# Patient Record
Sex: FEMALE | Race: WHITE | NOT HISPANIC OR LATINO | Employment: FULL TIME | URBAN - METROPOLITAN AREA
[De-identification: names, ages, dates, MRNs, and addresses within clinical notes are randomized per-mention and may not be internally consistent; named-entity substitution may affect disease eponyms.]

---

## 2023-02-03 ENCOUNTER — TELEPHONE (OUTPATIENT)
Dept: NEUROLOGY | Facility: CLINIC | Age: 48
End: 2023-02-03
Payer: COMMERCIAL

## 2023-02-23 ENCOUNTER — OFFICE VISIT (OUTPATIENT)
Dept: NEUROLOGY | Facility: CLINIC | Age: 48
End: 2023-02-23
Payer: COMMERCIAL

## 2023-02-23 VITALS
HEIGHT: 62 IN | HEART RATE: 66 BPM | SYSTOLIC BLOOD PRESSURE: 141 MMHG | BODY MASS INDEX: 24.83 KG/M2 | DIASTOLIC BLOOD PRESSURE: 76 MMHG | WEIGHT: 134.94 LBS

## 2023-02-23 DIAGNOSIS — G43.719 INTRACTABLE CHRONIC MIGRAINE WITHOUT AURA AND WITHOUT STATUS MIGRAINOSUS: Primary | ICD-10-CM

## 2023-02-23 DIAGNOSIS — Z01.00 ROUTINE EYE EXAM: ICD-10-CM

## 2023-02-23 PROCEDURE — 99999 PR PBB SHADOW E&M-EST. PATIENT-LVL III: ICD-10-PCS | Mod: PBBFAC,,, | Performed by: STUDENT IN AN ORGANIZED HEALTH CARE EDUCATION/TRAINING PROGRAM

## 2023-02-23 PROCEDURE — 99203 OFFICE O/P NEW LOW 30 MIN: CPT | Mod: S$GLB,,, | Performed by: STUDENT IN AN ORGANIZED HEALTH CARE EDUCATION/TRAINING PROGRAM

## 2023-02-23 PROCEDURE — 3078F PR MOST RECENT DIASTOLIC BLOOD PRESSURE < 80 MM HG: ICD-10-PCS | Mod: CPTII,S$GLB,, | Performed by: STUDENT IN AN ORGANIZED HEALTH CARE EDUCATION/TRAINING PROGRAM

## 2023-02-23 PROCEDURE — 3008F PR BODY MASS INDEX (BMI) DOCUMENTED: ICD-10-PCS | Mod: CPTII,S$GLB,, | Performed by: STUDENT IN AN ORGANIZED HEALTH CARE EDUCATION/TRAINING PROGRAM

## 2023-02-23 PROCEDURE — 99203 PR OFFICE/OUTPT VISIT, NEW, LEVL III, 30-44 MIN: ICD-10-PCS | Mod: S$GLB,,, | Performed by: STUDENT IN AN ORGANIZED HEALTH CARE EDUCATION/TRAINING PROGRAM

## 2023-02-23 PROCEDURE — 99999 PR PBB SHADOW E&M-EST. PATIENT-LVL III: CPT | Mod: PBBFAC,,, | Performed by: STUDENT IN AN ORGANIZED HEALTH CARE EDUCATION/TRAINING PROGRAM

## 2023-02-23 PROCEDURE — 1159F PR MEDICATION LIST DOCUMENTED IN MEDICAL RECORD: ICD-10-PCS | Mod: CPTII,S$GLB,, | Performed by: STUDENT IN AN ORGANIZED HEALTH CARE EDUCATION/TRAINING PROGRAM

## 2023-02-23 PROCEDURE — 99213 OFFICE O/P EST LOW 20 MIN: CPT | Mod: PBBFAC | Performed by: STUDENT IN AN ORGANIZED HEALTH CARE EDUCATION/TRAINING PROGRAM

## 2023-02-23 PROCEDURE — 3077F PR MOST RECENT SYSTOLIC BLOOD PRESSURE >= 140 MM HG: ICD-10-PCS | Mod: CPTII,S$GLB,, | Performed by: STUDENT IN AN ORGANIZED HEALTH CARE EDUCATION/TRAINING PROGRAM

## 2023-02-23 PROCEDURE — 3008F BODY MASS INDEX DOCD: CPT | Mod: CPTII,S$GLB,, | Performed by: STUDENT IN AN ORGANIZED HEALTH CARE EDUCATION/TRAINING PROGRAM

## 2023-02-23 PROCEDURE — 3078F DIAST BP <80 MM HG: CPT | Mod: CPTII,S$GLB,, | Performed by: STUDENT IN AN ORGANIZED HEALTH CARE EDUCATION/TRAINING PROGRAM

## 2023-02-23 PROCEDURE — 1159F MED LIST DOCD IN RCRD: CPT | Mod: CPTII,S$GLB,, | Performed by: STUDENT IN AN ORGANIZED HEALTH CARE EDUCATION/TRAINING PROGRAM

## 2023-02-23 PROCEDURE — 3077F SYST BP >= 140 MM HG: CPT | Mod: CPTII,S$GLB,, | Performed by: STUDENT IN AN ORGANIZED HEALTH CARE EDUCATION/TRAINING PROGRAM

## 2023-02-23 RX ORDER — UBROGEPANT 100 MG/1
100 TABLET ORAL ONCE AS NEEDED
Qty: 8 TABLET | Refills: 0 | Status: SHIPPED | OUTPATIENT
Start: 2023-02-23 | End: 2023-02-23

## 2023-02-23 RX ORDER — GALCANEZUMAB 120 MG/ML
240 INJECTION, SOLUTION SUBCUTANEOUS ONCE
Qty: 2 ML | Refills: 0 | Status: SHIPPED | OUTPATIENT
Start: 2023-02-23 | End: 2023-03-17

## 2023-02-23 RX ORDER — GALCANEZUMAB 120 MG/ML
120 INJECTION, SOLUTION SUBCUTANEOUS
Qty: 4 ML | Refills: 0 | Status: SHIPPED | OUTPATIENT
Start: 2023-02-23

## 2023-02-23 NOTE — PROGRESS NOTES
Neurology Clinic Note      Date: 2/23/23  Patient Name: Amelia Kenney   MRN: [id number]   PCP: Primary Doctor No  Referring Provider: Self, Aaareferral    Assessment and Plan:   Amelia Kenney is a 47 y.o. female presenting for evaluation of chronic migraines.  Was previously on Emgality and doing well on it.    -- Preventive: Sent prior authorization for Emgality (240mg loading dose followed 120mg monthly).  She has previously tried and failed/not tolerated propranolol, topiramate and amitriptyline.  -- Acute: Sent prior authorization for Ubrelvy.   Has tried sumatriptan in the past but developed chest tightness to this and hence, would prefer to avoid a trial of other triptans.  -- Start tracking headaches via Migraine Rafael ke on phone.  -- Consider the following syupplements: Mg Oxide 400mg daily or Riboflavin (Vit B2) 400mg daily or CoQ 200mg daily  -- Lifestyle modifications:  1) Sleep hygiene  2) Healthy diet. Avoid processed foods.  3) Exercise. 30 mins of cardio atleast 3-4 days per week.      I will follow up with her in 3 months.        Problem List Items Addressed This Visit          Neuro    Intractable chronic migraine without aura and without status migrainosus - Primary    Relevant Medications    galcanezumab-gnlm (EMGALITY PEN) 120 mg/mL PnIj    galcanezumab-gnlm (EMGALITY PEN) 120 mg/mL PnIj    ubrogepant (UBRELVY) 100 mg tablet       Ophtho    Routine eye exam    Relevant Orders    Ambulatory referral/consult to Ophthalmology         Subjective:          HPI:   Ms. Amelia Kenney is a 47 y.o. female presenting for evaluation of migraines.    Has had headaches since she was a kid.  Describes them as mostly unilateral (right-sided) vs occipital/holocephalic throbbing headaches associated with nausea/vomiting, photophobia and phonophobia.  Associated with floaters in her visual field which occurs during the headache.  Sometimes when the headache is unilateral, it is associated with conjunctival injection  and lacrimation but these headaches typically lasts for days.    She was previously followed in [facility] and was started on Emgality around a years ago.  She noticed a remarkable improvement in her headache frequency and intensity since starting this medication.  She was also on Ubrelvy as needed but was requiring it less often while on the CGRP injectable.  She recently changed jobs and moved to [facility] and ran out of her Emgality around 3 months ago.  Since then, she has been having daily headaches.    Last MRI brain in 2022 was read as normal.    Medications previously tried -  Topiramate - develop side effects/could not tolerate  Propranolol - had low blood pressure  Sumatriptan - developed severe chest tightness and dizziness  Amitriptyline - could not tolerate    Her grandfather was diagnosed with cluster headaches    PAST MEDICAL HISTORY:  Past Medical History:   Diagnosis Date    ADHD     Anxiety     GERD (gastroesophageal reflux disease)     Hypothyroidism     Migraine        PAST SURGICAL HISTORY:  Past Surgical History:   Procedure Laterality Date     SECTION      HYSTERECTOMY, TOTAL, LAPAROSCOPIC, WITH SALPINGECTOMY         CURRENT MEDS:  Current Outpatient Medications   Medication Sig Dispense Refill    acyclovir (ZOVIRAX) 400 MG tablet TAKE 1 TABLET BY MOUTH 4 TIMES A  tablet 4    ALPRAZolam (XANAX) 0.25 MG tablet alprazolam 0.25 mg tablet      dextroamphetamine-amphetamine 10 mg Tab dextroamphetamine-amphetamine 10 mg tablet      EMGALITY  mg/mL PnIj       fluorometholone 0.1% (FML) 0.1 % DrpS fluorometholone 0.1 % eye drops,suspension      pantoprazole (PROTONIX) 40 MG tablet pantoprazole 40 mg tablet,delayed release      propranoloL (INDERAL) 40 MG tablet propranolol 40 mg tablet      galcanezumab-gnlm (EMGALITY PEN) 120 mg/mL PnIj Inject 240 mg into the skin once. First dose for 1 dose 2 mL 0    galcanezumab-gnlm (EMGALITY PEN) 120 mg/mL PnIj Inject 120 mg  "into the skin every 28 days. 4 mL 0    levothyroxine (SYNTHROID) 125 MCG tablet Take 125 mcg by mouth.      ubrogepant (UBRELVY) 100 mg tablet Take 1 tablet (100 mg total) by mouth once as needed for Migraine. 8 tablet 0     No current facility-administered medications for this visit.       ALLERGIES:  Review of patient's allergies indicates:   Allergen Reactions    Topiramate Other (See Comments)     Numbness in arms          FAMILY HISTORY:  Family History   Problem Relation Age of Onset    Ovarian cancer Maternal Grandmother     Hypertension Maternal Grandmother     Colon cancer Maternal Grandfather     Heart disease Paternal Grandmother        SOCIAL HISTORY:  Social History     Tobacco Use    Smoking status: Never    Smokeless tobacco: Never   Substance Use Topics    Alcohol use: Yes       Review of Systems:  12 system review of systems is negative except for the symptoms mentioned in HPI.      Objective:     Vitals:    02/23/23 1027   BP: (!) 141/76   Pulse: 66   Weight: 61.2 kg (134 lb 14.7 oz)   Height: 5' 2" (1.575 m)     General: NAD, well nourished   Eyes: no tearing, discharge, no erythema   Neck: Supple, full range of motion  Cardiovascular: Warm and well perfused  Lungs: Normal work of breathing  Skin: No rash, lesions, or breakdown on exposed skin  Psychiatry: Mood and affect are appropriate       NEUROLOGICAL EXAMINATION:     MENTAL STATUS   Oriented to person, place, and time.   Follows 2 step commands.   Speech: speech is normal   Level of consciousness: alert    CRANIAL NERVES     CN II   Visual fields full to confrontation.     CN III, IV, VI   Extraocular motions are normal.   Nystagmus: none   Ophthalmoparesis: none    CN V   Facial sensation intact.     CN VII   Facial expression full, symmetric.     CN XI   CN XI normal.     CN XII   CN XII normal.     MOTOR EXAM   Right leg tone: spastic       5/5 in bilateral upper and lower extremities.     SENSORY EXAM   Light touch normal.     GAIT AND " COORDINATION     Gait  Gait: normal     Coordination   Finger to nose coordination: normal    Tremor   Intention tremor: absent        Jaxson Perez MD  Department of Neurology  [facility]

## 2023-02-23 NOTE — PATIENT INSTRUCTIONS
-- Preventive: Sent prior authorization for Emgality (240mg loading dose followed 120mg monthly).    -- Acute: Sent prior authorization for Ubrelvy.   -- Start tracking headaches via Migraine Rafael ke on phone.  -- Consider the following syupplements: Mg Oxide 400mg daily or Riboflavin (Vit B2) 400mg daily or CoQ 200mg daily  -- Lifestyle modifications:  1) Sleep hygiene  2) Healthy diet. Avoid processed foods.  3) Exercise. 30 mins of cardio atleast 3-4 days per week.

## 2023-03-01 ENCOUNTER — TELEPHONE (OUTPATIENT)
Dept: PHARMACY | Facility: CLINIC | Age: 48
End: 2023-03-01
Payer: COMMERCIAL

## 2023-03-01 NOTE — TELEPHONE ENCOUNTER
DOCUMENTATION ONLY  Emgality (2mL per 30 days)  Approval date: 2/28/2023 to 3/27/2023   Case ID# [id number]    Emgality (1mL per 30 days)  Approval date: 3/22/2023 to 8/21/2023   Case ID# [id number]

## 2023-03-16 ENCOUNTER — PATIENT MESSAGE (OUTPATIENT)
Dept: PHARMACY | Facility: CLINIC | Age: 48
End: 2023-03-16
Payer: COMMERCIAL

## 2023-06-30 ENCOUNTER — TELEPHONE (OUTPATIENT)
Dept: PHARMACY | Facility: CLINIC | Age: 48
End: 2023-06-30
Payer: COMMERCIAL

## 2023-06-30 ENCOUNTER — TELEPHONE (OUTPATIENT)
Dept: OBSTETRICS AND GYNECOLOGY | Facility: CLINIC | Age: 48
End: 2023-06-30
Payer: COMMERCIAL

## 2023-06-30 ENCOUNTER — LAB VISIT (OUTPATIENT)
Dept: LAB | Facility: HOSPITAL | Age: 48
End: 2023-06-30
Attending: OBSTETRICS & GYNECOLOGY
Payer: COMMERCIAL

## 2023-06-30 DIAGNOSIS — N39.0 URINARY TRACT INFECTION WITHOUT HEMATURIA, SITE UNSPECIFIED: Primary | ICD-10-CM

## 2023-06-30 DIAGNOSIS — N39.0 URINARY TRACT INFECTION WITHOUT HEMATURIA, SITE UNSPECIFIED: ICD-10-CM

## 2023-06-30 PROCEDURE — 87186 SC STD MICRODIL/AGAR DIL: CPT | Performed by: OBSTETRICS & GYNECOLOGY

## 2023-06-30 PROCEDURE — 87086 URINE CULTURE/COLONY COUNT: CPT | Performed by: OBSTETRICS & GYNECOLOGY

## 2023-06-30 PROCEDURE — 87077 CULTURE AEROBIC IDENTIFY: CPT | Performed by: OBSTETRICS & GYNECOLOGY

## 2023-06-30 PROCEDURE — 87088 URINE BACTERIA CULTURE: CPT | Performed by: OBSTETRICS & GYNECOLOGY

## 2023-06-30 RX ORDER — CIPROFLOXACIN 500 MG/1
500 TABLET ORAL 2 TIMES DAILY
Qty: 6 TABLET | Refills: 0 | Status: SHIPPED | OUTPATIENT
Start: 2023-06-30 | End: 2023-07-03

## 2023-06-30 NOTE — TELEPHONE ENCOUNTER
Hello,     The prior authorization for Amelia Kenney's UBRELVY prescription has been APPROVED FROM 06/30/2023 TO 12/26/2023 with copayment of $0.       [facility] [phone] will reach out to patient for further correspondence.       If there are any additional questions or concerns, please contact me.    Sincerely,  Kait Rosenberg  Prior Authorization Department  [facility]  [phone]

## 2023-07-02 LAB — BACTERIA UR CULT: ABNORMAL

## 2023-09-13 ENCOUNTER — TELEPHONE (OUTPATIENT)
Dept: OBSTETRICS AND GYNECOLOGY | Facility: CLINIC | Age: 48
End: 2023-09-13
Payer: COMMERCIAL

## 2023-09-13 ENCOUNTER — HOSPITAL ENCOUNTER (OUTPATIENT)
Dept: RADIOLOGY | Facility: OTHER | Age: 48
Discharge: HOME | End: 2023-09-13
Attending: OBSTETRICS & GYNECOLOGY
Payer: COMMERCIAL

## 2023-09-13 VITALS — WEIGHT: 134 LBS | BODY MASS INDEX: 24.51 KG/M2

## 2023-09-13 DIAGNOSIS — E07.9 THYROID DISORDER: ICD-10-CM

## 2023-09-13 DIAGNOSIS — Z01.419 ENCOUNTER FOR WELL WOMAN EXAM WITH ROUTINE GYNECOLOGICAL EXAM: ICD-10-CM

## 2023-09-13 DIAGNOSIS — Z12.31 SCREENING MAMMOGRAM FOR BREAST CANCER: Primary | ICD-10-CM

## 2023-09-13 DIAGNOSIS — Z12.31 SCREENING MAMMOGRAM FOR BREAST CANCER: ICD-10-CM

## 2023-09-13 PROCEDURE — 77067 SCR MAMMO BI INCL CAD: CPT | Mod: 26,,, | Performed by: RADIOLOGY

## 2023-09-13 PROCEDURE — 77067 MAMMO DIGITAL SCREENING BILAT WITH TOMO: ICD-10-PCS | Mod: 26,,, | Performed by: RADIOLOGY

## 2023-09-13 PROCEDURE — 77063 BREAST TOMOSYNTHESIS BI: CPT | Mod: 26,,, | Performed by: RADIOLOGY

## 2023-09-13 PROCEDURE — 77067 SCR MAMMO BI INCL CAD: CPT | Mod: TC

## 2023-09-13 PROCEDURE — 77063 MAMMO DIGITAL SCREENING BILAT WITH TOMO: ICD-10-PCS | Mod: 26,,, | Performed by: RADIOLOGY

## 2023-09-15 ENCOUNTER — LAB VISIT (OUTPATIENT)
Dept: LAB | Facility: HOSPITAL | Age: 48
End: 2023-09-15
Attending: OBSTETRICS & GYNECOLOGY
Payer: COMMERCIAL

## 2023-09-15 DIAGNOSIS — E07.9 THYROID DISORDER: ICD-10-CM

## 2023-09-15 DIAGNOSIS — Z01.419 ENCOUNTER FOR WELL WOMAN EXAM WITH ROUTINE GYNECOLOGICAL EXAM: ICD-10-CM

## 2023-09-15 LAB
BASOPHILS # BLD AUTO: 0.04 K/UL (ref 0–0.2)
BASOPHILS NFR BLD: 0.8 % (ref 0–1.9)
CHOLEST SERPL-MCNC: 178 MG/DL (ref 120–199)
CHOLEST/HDLC SERPL: 2.5 {RATIO} (ref 2–5)
DIFFERENTIAL METHOD: ABNORMAL
EOSINOPHIL # BLD AUTO: 0.1 K/UL (ref 0–0.5)
EOSINOPHIL NFR BLD: 2.7 % (ref 0–8)
ERYTHROCYTE [DISTWIDTH] IN BLOOD BY AUTOMATED COUNT: 11.4 % (ref 11.5–14.5)
HCT VFR BLD AUTO: 42.9 % (ref 37–48.5)
HDLC SERPL-MCNC: 72 MG/DL (ref 40–75)
HDLC SERPL: 40.4 % (ref 20–50)
HGB BLD-MCNC: 14.4 G/DL (ref 12–16)
IMM GRANULOCYTES # BLD AUTO: 0.01 K/UL (ref 0–0.04)
IMM GRANULOCYTES NFR BLD AUTO: 0.2 % (ref 0–0.5)
LDLC SERPL CALC-MCNC: 92 MG/DL (ref 63–159)
LYMPHOCYTES # BLD AUTO: 1.3 K/UL (ref 1–4.8)
LYMPHOCYTES NFR BLD: 25.3 % (ref 18–48)
MCH RBC QN AUTO: 31.9 PG (ref 27–31)
MCHC RBC AUTO-ENTMCNC: 33.6 G/DL (ref 32–36)
MCV RBC AUTO: 95 FL (ref 82–98)
MONOCYTES # BLD AUTO: 0.5 K/UL (ref 0.3–1)
MONOCYTES NFR BLD: 9.2 % (ref 4–15)
NEUTROPHILS # BLD AUTO: 3.2 K/UL (ref 1.8–7.7)
NEUTROPHILS NFR BLD: 61.8 % (ref 38–73)
NONHDLC SERPL-MCNC: 106 MG/DL
NRBC BLD-RTO: 0 /100 WBC
PLATELET # BLD AUTO: 287 K/UL (ref 150–450)
PMV BLD AUTO: 11 FL (ref 9.2–12.9)
RBC # BLD AUTO: 4.52 M/UL (ref 4–5.4)
T3 SERPL-MCNC: 71 NG/DL (ref 60–180)
T4 FREE SERPL-MCNC: 1.14 NG/DL (ref 0.71–1.51)
TRIGL SERPL-MCNC: 70 MG/DL (ref 30–150)
TSH SERPL DL<=0.005 MIU/L-ACNC: 0.03 UIU/ML (ref 0.4–4)
WBC # BLD AUTO: 5.22 K/UL (ref 3.9–12.7)

## 2023-09-15 PROCEDURE — 84439 ASSAY OF FREE THYROXINE: CPT | Performed by: OBSTETRICS & GYNECOLOGY

## 2023-09-15 PROCEDURE — 84480 ASSAY TRIIODOTHYRONINE (T3): CPT | Performed by: OBSTETRICS & GYNECOLOGY

## 2023-09-15 PROCEDURE — 85025 COMPLETE CBC W/AUTO DIFF WBC: CPT | Performed by: OBSTETRICS & GYNECOLOGY

## 2023-09-15 PROCEDURE — 84443 ASSAY THYROID STIM HORMONE: CPT | Performed by: OBSTETRICS & GYNECOLOGY

## 2023-09-15 PROCEDURE — 36415 COLL VENOUS BLD VENIPUNCTURE: CPT | Mod: PN | Performed by: OBSTETRICS & GYNECOLOGY

## 2023-09-15 PROCEDURE — 80061 LIPID PANEL: CPT | Performed by: OBSTETRICS & GYNECOLOGY

## 2024-01-11 ENCOUNTER — TELEPHONE (OUTPATIENT)
Dept: PRIMARY CARE CLINIC | Facility: CLINIC | Age: 49
End: 2024-01-11
Payer: COMMERCIAL

## 2024-01-16 ENCOUNTER — LAB VISIT (OUTPATIENT)
Dept: LAB | Facility: OTHER | Age: 49
End: 2024-01-16
Attending: OBSTETRICS & GYNECOLOGY
Payer: COMMERCIAL

## 2024-01-16 DIAGNOSIS — Z01.89 ROUTINE LAB DRAW: ICD-10-CM

## 2024-01-16 DIAGNOSIS — Z01.89 ROUTINE LAB DRAW: Primary | ICD-10-CM

## 2024-01-16 PROCEDURE — 86765 RUBEOLA ANTIBODY: CPT | Performed by: OBSTETRICS & GYNECOLOGY

## 2024-01-16 PROCEDURE — 86762 RUBELLA ANTIBODY: CPT | Performed by: OBSTETRICS & GYNECOLOGY

## 2024-01-16 PROCEDURE — 86787 VARICELLA-ZOSTER ANTIBODY: CPT | Performed by: OBSTETRICS & GYNECOLOGY

## 2024-01-16 PROCEDURE — 86480 TB TEST CELL IMMUN MEASURE: CPT | Performed by: OBSTETRICS & GYNECOLOGY

## 2024-01-16 PROCEDURE — 86706 HEP B SURFACE ANTIBODY: CPT | Performed by: OBSTETRICS & GYNECOLOGY

## 2024-01-16 PROCEDURE — 86735 MUMPS ANTIBODY: CPT | Performed by: OBSTETRICS & GYNECOLOGY

## 2024-01-17 LAB
RUBV IGG SER-ACNC: 59.7 IU/ML
RUBV IGG SER-IMP: REACTIVE

## 2024-01-20 LAB
GAMMA INTERFERON BACKGROUND BLD IA-ACNC: 0.11 IU/ML
M TB IFN-G CD4+ BCKGRND COR BLD-ACNC: -0.02 IU/ML
M TB IFN-G CD4+ BCKGRND COR BLD-ACNC: -0.02 IU/ML
MITOGEN IGNF BCKGRD COR BLD-ACNC: 9.89 IU/ML
TB GOLD PLUS: NEGATIVE

## 2024-01-21 LAB
MUMPS IGG INTERPRETATION: POSITIVE
MUMPS IGG SCREEN: 64.2 AU/ML
RUBEOLA IGG ANTIBODY: >300 AU/ML
RUBEOLA INTERPRETATION: POSITIVE
VARICELLA INTERPRETATION: POSITIVE
VARICELLA ZOSTER IGG: 1256 AU/ML

## 2024-01-22 LAB
HBV SURFACE AB SER QL IA: POSITIVE
HBV SURFACE AB SERPL IA-ACNC: 52 MIU/ML

## 2024-03-19 RX ORDER — PANTOPRAZOLE SODIUM 40 MG/1
40 TABLET, DELAYED RELEASE ORAL EVERY MORNING
Qty: 30 TABLET | Refills: 11 | Status: SHIPPED | OUTPATIENT
Start: 2024-03-19

## 2024-03-19 RX ORDER — DEXTROAMPHETAMINE SACCHARATE, AMPHETAMINE ASPARTATE, DEXTROAMPHETAMINE SULFATE AND AMPHETAMINE SULFATE 5; 5; 5; 5 MG/1; MG/1; MG/1; MG/1
1 TABLET ORAL DAILY
Qty: 30 TABLET | Refills: 0 | Status: SHIPPED | OUTPATIENT
Start: 2024-03-19 | End: 2024-05-10 | Stop reason: SDUPTHER

## 2024-03-19 RX ORDER — LEVOTHYROXINE SODIUM 112 UG/1
112 TABLET ORAL DAILY
Qty: 30 TABLET | Refills: 11 | Status: SHIPPED | OUTPATIENT
Start: 2024-03-19 | End: 2025-03-19

## 2024-05-08 PROBLEM — K21.9 GERD (GASTROESOPHAGEAL REFLUX DISEASE): Status: ACTIVE | Noted: 2024-05-08

## 2024-05-09 NOTE — PROGRESS NOTES
[facility] Note    Chief Complaint      Chief Complaint   Patient presents with    Establish Care       History of Present Illness      Amelia Kenney is a 49 y.o. F with GERD, Polycythemia, Hyperbilirubinemia, ADHD, Insomnia, Migraines, Hypothyroidism, Chronic constipation, Nephrolithiasis, DDD C spine  presents to est PCP and to fu chronic issues.     Migraines - Was previously on Emgality and doing well on it. She has previously tried and failed/not tolerated propranolol, topiramate and amitriptyline. Pt had reaction to Topamax - numbness in arms. Pt on Ubrelvy prn. Has tried sumatriptan in the past but developed chest tightness to this and hence, would prefer to avoid a trial of other triptans.    Hypothyroidism - Pt on levothyroxine 112 mcg/d.  She takes 30 min prior to other meds/food. Last TSH - low in Sept.  Will repeat TSH.     GERD - Pt on Protonix daily.     ADHD - Pt on adderall 20 mg/d. Pt has not had formal testing as an adult but has been getting from Prev PCP in  since 2015. Will fill for now with idea she will get her testing when gets insurance    Onychomycosis - Will try to RX Ciclopirox and if not tolerated can try OTC Funginail.    ?h/o polycythemia - H/H - 14.4/42.9 - wnl - 9/15/23    Depression - Prev on wellbutrin x a few yrs but stopped it in Jan. Doing well off meds.     HCM - Flu - 12/1/23;  Tdap - 1/16/24;  PCV 13 - none;  PVX 23 - none;  Shingrix - none - due at 52 y.o.;  COVID - 19 Vaccine 10/6/22 and #2 1-/27/22; MGM - 9/13/23 - repeat 1 yr;  DEXA - none;  PAP - s/p total Hys no BSO due to fibroids; Hep C Screen - none - defers;  HIV Screen - none - defers; C-scope - none;   Prev PCP - Dr. Foster in  ; Ob/GYN - Dr. Quiñones; Neuro - Dr. Perez    Patient Care Team:  Bruna Garcia MD as PCP - General (Internal Medicine)     Health Maintenance:  Immunization History   Administered Date(s) Administered    COVID-19, MRNA, LN-S, PF (Pfizer) (Gray Cap)  10/06/2022, 10/27/2022    Influenza - Quadrivalent - PF *Preferred* (6 months and older) 2022, 2023    PPD Test 2024    Tdap 2024      Health Maintenance   Topic Date Due    Hepatitis C Screening  Never done    Colorectal Cancer Screening  Never done    Mammogram  2024    Lipid Panel  09/15/2028    TETANUS VACCINE  2034        Past Medical History:  Past Medical History:   Diagnosis Date    ADHD     Anxiety     GERD (gastroesophageal reflux disease)     Hypothyroidism     Migraine        Past Surgical History:   has a past surgical history that includes  section; hysterectomy, total, laparoscopic, with salpingectomy; and Tubal ligation.    Family History:  family history includes Alcohol abuse in her father; Colon cancer in her maternal grandfather; Diabetes in her maternal grandmother; Heart disease in her paternal grandmother; Hypertension in her maternal grandmother; Kidney disease in her daughter.     Social History:  Social History     Tobacco Use    Smoking status: Never    Smokeless tobacco: Never   Substance Use Topics    Alcohol use: Yes     Alcohol/week: 3.0 standard drinks of alcohol     Types: 3 Glasses of wine per week     Comment: 1 glass wine x 2-3/wk    Drug use: Not Currently       Review of Systems   Constitutional:  Negative for chills, diaphoresis and fever.   HENT:  Negative for hearing loss.    Eyes:  Positive for visual disturbance.        Wears readers - worsening   Respiratory:  Negative for cough, shortness of breath and wheezing.    Cardiovascular:  Negative for chest pain, palpitations and leg swelling.   Gastrointestinal:  Positive for abdominal pain, constipation and nausea. Negative for blood in stool, diarrhea, vomiting and reflux.        Occ epigastric pain.  GERD controlled on Protonix daily. Always feels bloated.  Reports neg EGD and gastroparesis study.  Rec adeq hydration.  Rec inc fiber in diet.  Rec Metamucil and Miralax prn.        Endocrine: Negative for cold intolerance, heat intolerance and polydipsia.   Genitourinary:  Negative for dysuria, frequency and hematuria.   Musculoskeletal:  Negative for arthralgias and myalgias.   Neurological:  Negative for dizziness and headaches.   Psychiatric/Behavioral:  Negative for dysphoric mood. The patient is nervous/anxious.         She is in school. Stable and situational.         Medications:    Current Outpatient Medications:     acyclovir (ZOVIRAX) 400 MG tablet, TAKE 1 TABLET BY MOUTH 4 TIMES A DAY (Patient taking differently: Take 400 mg by mouth Daily.), Disp: 120 tablet, Rfl: 4    galcanezumab-gnlm (EMGALITY PEN) 120 mg/mL PnIj, Inject 120 mg into the skin every 28 days., Disp: 4 mL, Rfl: 0    levothyroxine (SYNTHROID) 112 MCG tablet, Take 1 tablet (112 mcg total) by mouth once daily., Disp: 30 tablet, Rfl: 11    pantoprazole (PROTONIX) 40 MG tablet, Take 1 tablet by mouth every morning., Disp: 90 tablet, Rfl: 1    pantoprazole (PROTONIX) 40 MG tablet, Take 1 tablet by mouth in the morning., Disp: 30 tablet, Rfl: 11    ubrogepant (UBRELVY) 100 mg tablet, Take one a onset of migraine.  May repeat if needed.  Max 2 in 24 hrs, Disp: 10 tablet, Rfl: 3    ciclopirox (PENLAC) 8 % Soln, Apply topically nightly. Remove once weekly with nail polish remover, Disp: 6.6 mL, Rfl: 1    dextroamphetamine-amphetamine (ADDERALL) 20 mg tablet, Take 1 tablet by mouth once daily., Disp: 30 tablet, Rfl: 0    [START ON 6/7/2024] dextroamphetamine-amphetamine (ADDERALL) 20 mg tablet, Take 1 tablet by mouth once daily., Disp: 30 tablet, Rfl: 0    [START ON 7/5/2024] dextroamphetamine-amphetamine (ADDERALL) 20 mg tablet, Take 1 tablet by mouth once daily., Disp: 30 tablet, Rfl: 0     Allergies:  Review of patient's allergies indicates:   Allergen Reactions    Topiramate Other (See Comments)     Numbness in arms          Physical Exam      Vital Signs  Temp: 98.2 °F (36.8 °C)  Temp Source: Oral  Pulse: 72  SpO2: 98  "%  BP: 136/86  BP Location: Right arm  Patient Position: Sitting  Pain Score:   5  Pain Loc: Shoulder  Height and Weight  Height: 5' 1" (154.9 cm)  Weight: 60.8 kg (134 lb 0.6 oz)  BSA (Calculated - sq m): 1.62 sq meters  BMI (Calculated): 25.3  Weight in (lb) to have BMI = 25: 132      Patient Position: Sitting      Physical Exam  Vitals reviewed.   Constitutional:       General: She is not in acute distress.     Appearance: Normal appearance. She is not ill-appearing, toxic-appearing or diaphoretic.   HENT:      Head: Normocephalic and atraumatic.      Right Ear: Tympanic membrane normal.      Left Ear: Tympanic membrane normal.      Mouth/Throat:      Mouth: Mucous membranes are moist.      Pharynx: No posterior oropharyngeal erythema.   Eyes:      Extraocular Movements: Extraocular movements intact.      Conjunctiva/sclera: Conjunctivae normal.      Pupils: Pupils are equal, round, and reactive to light.   Neck:      Vascular: No carotid bruit.   Cardiovascular:      Rate and Rhythm: Normal rate and regular rhythm.      Pulses: Normal pulses.      Heart sounds: Normal heart sounds.   Pulmonary:      Effort: Pulmonary effort is normal. No respiratory distress.      Breath sounds: Normal breath sounds.   Abdominal:      General: Bowel sounds are normal. There is no distension.      Palpations: Abdomen is soft.      Tenderness: There is no abdominal tenderness. There is no guarding or rebound.   Musculoskeletal:      Cervical back: Neck supple. No tenderness.   Neurological:      General: No focal deficit present.      Mental Status: She is alert and oriented to person, place, and time.   Psychiatric:         Mood and Affect: Mood normal.         Behavior: Behavior normal.          Laboratory:  CBC:  Recent Labs   Lab 09/15/23  0758   WBC 5.22   RBC 4.52   Hemoglobin 14.4   Hematocrit 42.9   Platelets 287   MCV 95   MCH 31.9 H   MCHC 33.6              LIPIDS:  Recent Labs   Lab 10/29/21  0757 09/15/23  0758   TSH " 0.18 L 0.028 L   HDL  --  72   Cholesterol  --  178   Triglycerides  --  70   LDL Cholesterol  --  92.0   HDL/Cholesterol Ratio  --  40.4   Non-HDL Cholesterol  --  106   Total Cholesterol/HDL Ratio  --  2.5       TSH:  Recent Labs   Lab 10/29/21  0757 09/15/23  0758   TSH 0.18 L 0.028 L       A1C:  Recent Labs   Lab 10/29/21  0757   Hemoglobin A1C 4.9               Assessment/Plan     Amelia Kenney is a 49 y.o.female with:    Hypothyroidism, unspecified type  -     TSH; Future; Expected date: 05/10/2024  - Recent low TSH.  Will repeat TSH and adjust as warranted.     Attention deficit hyperactivity disorder (ADHD), other type  -     dextroamphetamine-amphetamine (ADDERALL) 20 mg tablet; Take 1 tablet by mouth once daily.  Dispense: 30 tablet; Refill: 0  -     dextroamphetamine-amphetamine (ADDERALL) 20 mg tablet; Take 1 tablet by mouth once daily.  Dispense: 30 tablet; Refill: 0  -     dextroamphetamine-amphetamine (ADDERALL) 20 mg tablet; Take 1 tablet by mouth once daily.  Dispense: 30 tablet; Refill: 0  - Will cont current.. Rec formal testing when can afford.     Onychomycosis  -     ciclopirox (PENLAC) 8 % Soln; Apply topically nightly. Remove once weekly with nail polish remover  Dispense: 6.6 mL; Refill: 1  - Will try cicloipox - had nail polish on so could not fully eval.  Pt describes thick/yellow nails to tasha great toenails. If not affordable can try OTC funginail.    Intractable chronic migraine without aura and without status migrainosus  - Stable.  Cont current regimen as per Neuro.    Screening for colorectal cancer  -     Ambulatory referral/consult to Endo Procedure ; Future; Expected date: 05/11/2024         Chronic conditions status updated as per HPI.  Other than changes above, cont current medications and maintain follow up with specialists.   Follow up in about 3 months (around 8/10/2024).      Bruna Garcia MD  [facility]

## 2024-05-10 ENCOUNTER — OFFICE VISIT (OUTPATIENT)
Dept: PRIMARY CARE CLINIC | Facility: CLINIC | Age: 49
End: 2024-05-10

## 2024-05-10 ENCOUNTER — LAB VISIT (OUTPATIENT)
Dept: LAB | Facility: HOSPITAL | Age: 49
End: 2024-05-10
Attending: INTERNAL MEDICINE

## 2024-05-10 VITALS
DIASTOLIC BLOOD PRESSURE: 86 MMHG | WEIGHT: 134.06 LBS | HEIGHT: 61 IN | OXYGEN SATURATION: 98 % | TEMPERATURE: 98 F | SYSTOLIC BLOOD PRESSURE: 136 MMHG | BODY MASS INDEX: 25.31 KG/M2 | HEART RATE: 72 BPM

## 2024-05-10 DIAGNOSIS — Z12.11 SCREENING FOR COLORECTAL CANCER: ICD-10-CM

## 2024-05-10 DIAGNOSIS — E03.9 HYPOTHYROIDISM, UNSPECIFIED TYPE: ICD-10-CM

## 2024-05-10 DIAGNOSIS — Z12.12 SCREENING FOR COLORECTAL CANCER: ICD-10-CM

## 2024-05-10 DIAGNOSIS — F90.8 ATTENTION DEFICIT HYPERACTIVITY DISORDER (ADHD), OTHER TYPE: ICD-10-CM

## 2024-05-10 DIAGNOSIS — G43.719 INTRACTABLE CHRONIC MIGRAINE WITHOUT AURA AND WITHOUT STATUS MIGRAINOSUS: ICD-10-CM

## 2024-05-10 DIAGNOSIS — B35.1 ONYCHOMYCOSIS: ICD-10-CM

## 2024-05-10 DIAGNOSIS — E03.9 HYPOTHYROIDISM, UNSPECIFIED TYPE: Primary | ICD-10-CM

## 2024-05-10 PROBLEM — M50.30 DDD (DEGENERATIVE DISC DISEASE), CERVICAL: Status: ACTIVE | Noted: 2024-05-10

## 2024-05-10 LAB — TSH SERPL DL<=0.005 MIU/L-ACNC: 1.64 UIU/ML (ref 0.4–4)

## 2024-05-10 PROCEDURE — 36415 COLL VENOUS BLD VENIPUNCTURE: CPT | Mod: PN | Performed by: INTERNAL MEDICINE

## 2024-05-10 PROCEDURE — 99204 OFFICE O/P NEW MOD 45 MIN: CPT | Mod: S$PBB,,, | Performed by: INTERNAL MEDICINE

## 2024-05-10 PROCEDURE — 99214 OFFICE O/P EST MOD 30 MIN: CPT | Mod: PBBFAC,PN | Performed by: INTERNAL MEDICINE

## 2024-05-10 PROCEDURE — 84443 ASSAY THYROID STIM HORMONE: CPT | Performed by: INTERNAL MEDICINE

## 2024-05-10 PROCEDURE — 99999 PR PBB SHADOW E&M-EST. PATIENT-LVL IV: CPT | Mod: PBBFAC,,, | Performed by: INTERNAL MEDICINE

## 2024-05-10 RX ORDER — DEXTROAMPHETAMINE SACCHARATE, AMPHETAMINE ASPARTATE, DEXTROAMPHETAMINE SULFATE AND AMPHETAMINE SULFATE 5; 5; 5; 5 MG/1; MG/1; MG/1; MG/1
1 TABLET ORAL DAILY
Qty: 30 TABLET | Refills: 0 | Status: SHIPPED | OUTPATIENT
Start: 2024-05-10

## 2024-05-10 RX ORDER — CICLOPIROX 80 MG/ML
SOLUTION TOPICAL NIGHTLY
Qty: 6.6 ML | Refills: 1 | Status: SHIPPED | OUTPATIENT
Start: 2024-05-10

## 2024-05-10 RX ORDER — DEXTROAMPHETAMINE SACCHARATE, AMPHETAMINE ASPARTATE, DEXTROAMPHETAMINE SULFATE AND AMPHETAMINE SULFATE 5; 5; 5; 5 MG/1; MG/1; MG/1; MG/1
1 TABLET ORAL DAILY
Qty: 30 TABLET | Refills: 0 | Status: SHIPPED | OUTPATIENT
Start: 2024-06-07

## 2024-05-10 RX ORDER — DEXTROAMPHETAMINE SACCHARATE, AMPHETAMINE ASPARTATE, DEXTROAMPHETAMINE SULFATE AND AMPHETAMINE SULFATE 5; 5; 5; 5 MG/1; MG/1; MG/1; MG/1
1 TABLET ORAL DAILY
Qty: 30 TABLET | Refills: 0 | Status: SHIPPED | OUTPATIENT
Start: 2024-07-05

## 2024-05-13 NOTE — PROGRESS NOTES
I sent pt a my chart message -  I reviewed your  labs.  Your thyroid functions are normal on levothyroxine 112 mcg/d.  Continue your current regimen.  No further recommendations at this time.  Dr. LLANOS